# Patient Record
(demographics unavailable — no encounter records)

---

## 2024-10-09 NOTE — PHYSICAL EXAM
[Normal Sclera/Conjunctiva] : normal sclera/conjunctiva [Normal] : normal rate, regular rhythm, normal S1 and S2 and no murmur heard [No Spinal Tenderness] : no spinal tenderness [de-identified] : good ROM of back

## 2024-10-09 NOTE — HISTORY OF PRESENT ILLNESS
[FreeTextEntry1] : Follow-up [de-identified] : 56-year-old female history of DM2, HTN, HLD presents to clinic today for medication refill.  Patient is new to provider.  Patient currently following with obesity medicine - Currently on Ozempic 2mg -Tolerating well feels as though weight loss has plateaued but notes sugars have been controlled -Will continue to follow with them   Taking BP meds as prescribed: Yes Diet: well varied with fruits and veg  Exercise: Hasn't been very active - walking and also trying to be more active  Sleep: sleeping 6-7 hrs sometimes wakes up at night but falls back to sleep right away  Ophthalmology: last appt withing the year Podiatry: "Notes has been a while" will make new appt   Low back pain - Per patient has been chronic that she used to do many sports when she was younger - Had PT in the past but has not been in many years notes it was helpful at that time -Denies numbness/tingling in lower extremities denies loss of sensation in groin area or legs or back denies inability to walk or difficulty walking, denies any issues with voiding. Mild has not needed any pain relief medications such as Aleve or Tylenol

## 2024-12-08 NOTE — HISTORY OF PRESENT ILLNESS
[Home] : at home, [unfilled] , at the time of the visit. [Medical Office: ([facility])___] : at the medical office located in  [Verbal consent obtained from patient] : the patient, [unfilled] [FreeTextEntry1] : 57F PMH metabolic syndrome w/class II obesity, DM2, HTN, dyslipidemia, palpitations, who presents to weight management for follow-up.  She initially presented 7/2023 reporting that she tended to gain weight when inactive, and noted gain during pregnancies and during the pandemic (from 250 lbs to 291 lbs, subsequently lost with lifestyle interventions). She was diagnosed with T2DM in April 2023, A1c 9%, was started on Jardiance 10 mg. Her A1c improved considerably over her first month post-dx (9% --> 7.9%). She notes she hadn't been started on metformin/GLP likely because she was having GI issues but they resolved with discontinuation of dairy. Her diet was noted for improved habits (eliminated dairy, more vegetables, lean proteins, smaller/early dinner w/o eating at night, all home-cooked meals). She recently started exercise.  We discussed lifestyle and dietary interventions.  She was prescribed Ozempic, she was already on Jardiance.  Weight today: 230 lbs, BMI 33 Weight at last visit (9/19/24): 235 lbs, BMI 33.72 Weight (6/4/24): 233 lbs, BMI 33.43 Weight (3/8/24): 229 lbs, BMI 32.86 Weight at Initial Visit (7/26/23): 253 lbs, BMI 36.3 Highest adult weight: 292 lbs (March/April 2023)  Medication: Has been on Ozempic 2 mg/wk, sometimes fatigue after taking it but no other reported adverse effects.   Diet: Tracking, getting protein and lower carbohydrate.  Usually 2 meals per day, sometimes a protein shake and meal (protein - chicken/fish/ occasional fish, veggies). B/D or L/D. Sometimes snack of fruit, or nuts. No soda/juice. Sometimes iced tea.   Exercise: Working out 2-3 x/wk,  (Less exercise, takes some walks, but hasn't been to the gym recently.

## 2024-12-08 NOTE — PHYSICAL EXAM
[Obese, well nourished, in no acute distress] : obese, well nourished, in no acute distress [de-identified] : TEB visit

## 2024-12-08 NOTE — ASSESSMENT
[FreeTextEntry1] : 57F PMH metabolic syndrome w/class II obesity, DM2, HTN, dyslipidemia, palpitations, who presents to weight management for follow-up.  # Metabolic syndrome w/class II obesity, DM2, HTN, dyslipidemia: Weight today 230 lbs, BMI 33, lost 5 lbs since last appointment 12 weeks ago, down 23 lbs from initial visit and 62 lbs from highest weight. Doing well on Ozempic 2 mg/wk. Dietary improvements, consistent exercise.  - Food log - C/w meal planning/prep - Discussed dietary recommendations, types of food to eat, intake timing, etc - Will consider dietician referral - Discussed building in light activity a few days per week and increasing.  - C/w Ozempic 2.0 mg/wk. May consider Metformin as well at some point, but overall she would like to minimize added meds so we will escalate incretin therapy and track biomarkers (ie A1c) to see if we need to add further agents (ie metformin/Jardiance). - F/u in ~3 months.

## 2024-12-08 NOTE — HISTORY OF PRESENT ILLNESS
Prescription sent to preferred pharmacy per protocol.   [Home] : at home, [unfilled] , at the time of the visit. [Medical Office: ([facility])___] : at the medical office located in  [Verbal consent obtained from patient] : the patient, [unfilled] [FreeTextEntry1] : 57F PMH metabolic syndrome w/class II obesity, DM2, HTN, dyslipidemia, palpitations, who presents to weight management for follow-up.  She initially presented 7/2023 reporting that she tended to gain weight when inactive, and noted gain during pregnancies and during the pandemic (from 250 lbs to 291 lbs, subsequently lost with lifestyle interventions). She was diagnosed with T2DM in April 2023, A1c 9%, was started on Jardiance 10 mg. Her A1c improved considerably over her first month post-dx (9% --> 7.9%). She notes she hadn't been started on metformin/GLP likely because she was having GI issues but they resolved with discontinuation of dairy. Her diet was noted for improved habits (eliminated dairy, more vegetables, lean proteins, smaller/early dinner w/o eating at night, all home-cooked meals). She recently started exercise.  We discussed lifestyle and dietary interventions.  She was prescribed Ozempic, she was already on Jardiance.  Weight today: 230 lbs, BMI 33 Weight at last visit (9/19/24): 235 lbs, BMI 33.72 Weight (6/4/24): 233 lbs, BMI 33.43 Weight (3/8/24): 229 lbs, BMI 32.86 Weight at Initial Visit (7/26/23): 253 lbs, BMI 36.3 Highest adult weight: 292 lbs (March/April 2023)  Medication: Has been on Ozempic 2 mg/wk, sometimes fatigue after taking it but no other reported adverse effects.   Diet: Tracking, getting protein and lower carbohydrate.  Usually 2 meals per day, sometimes a protein shake and meal (protein - chicken/fish/ occasional fish, veggies). B/D or L/D. Sometimes snack of fruit, or nuts. No soda/juice. Sometimes iced tea.   Exercise: Working out 2-3 x/wk,  (Less exercise, takes some walks, but hasn't been to the gym recently.

## 2024-12-08 NOTE — PHYSICAL EXAM
[Obese, well nourished, in no acute distress] : obese, well nourished, in no acute distress [de-identified] : TEB visit

## 2025-02-27 NOTE — PHYSICAL EXAM
[Obese, well nourished, in no acute distress] : obese, well nourished, in no acute distress [de-identified] : TEB visit

## 2025-02-27 NOTE — HISTORY OF PRESENT ILLNESS
[Home] : at home, [unfilled] , at the time of the visit. [Medical Office: ([facility])___] : at the medical office located in  [Verbal consent obtained from patient] : the patient, [unfilled] [FreeTextEntry1] : 57F PMH metabolic syndrome w/class II obesity, DM2, HTN, dyslipidemia, palpitations, who presents to weight management for follow-up.  She initially presented 7/2023 reporting that she tended to gain weight when inactive, and noted gain during pregnancies and during the pandemic (from 250 lbs to 291 lbs, subsequently lost with lifestyle interventions). She was diagnosed with T2DM in April 2023, A1c 9%, was started on Jardiance 10 mg. Her A1c improved considerably over her first month post-dx (9% --> 7.9%). She notes she hadn't been started on metformin/GLP likely because she was having GI issues but they resolved with discontinuation of dairy. Her diet was noted for improved habits (eliminated dairy, more vegetables, lean proteins, smaller/early dinner w/o eating at night, all home-cooked meals). She recently started exercise.  We discussed lifestyle and dietary interventions.  She was prescribed Ozempic, she was already on Jardiance.  Weight today:  Weight at last visit (12/6/25): 230 lbs, BMI 33 Weight (9/19/24): 235 lbs, BMI 33.72 Weight (6/4/24): 233 lbs, BMI 33.43 Weight (3/8/24): 229 lbs, BMI 32.86 Weight at Initial Visit (7/26/23): 253 lbs, BMI 36.3 Highest adult weight: 292 lbs (March/April 2023)  Medication:  (Has been on Ozempic 2 mg/wk, sometimes fatigue after taking it but no other reported adverse effects.   Diet:  (Tracking, getting protein and lower carbohydrate.  Usually 2 meals per day, sometimes a protein shake and meal (protein - chicken/fish/ occasional fish, veggies). B/D or L/D. Sometimes snack of fruit, or nuts. No soda/juice. Sometimes iced tea.   Exercise:  (Working out 2-3 x/wk,  (Less exercise, takes some walks, but hasn't been to the gym recently.

## 2025-03-03 NOTE — ASSESSMENT
[FreeTextEntry1] : 57F PMH metabolic syndrome w/class II obesity, DM2, HTN, dyslipidemia, palpitations, who presents to weight management for follow-up.  # Metabolic syndrome w/class II obesity, DM2, HTN, dyslipidemia: Weight today 227.5 lbs, BMI 32.64, lost a couple of pounds since last visit, has generally been stable, down almost 30 lbs since starting Wegovy and 65 lbs since starting to lose weight a few months earlier. Doing well on Ozempic 2 mg/wk, no significant adverse effects. Reporting consistency with diet/activity. Strength training limited by OA of knees and arthritis.  - Food log - C/w meal planning/prep - Discussed dietary recommendations, types of food to eat, intake timing, etc - Will consider dietician referral - Discussed building in light activity a few days per week and increasing.  - C/w Ozempic 2.0 mg/wk. May consider Metformin as well at some point, but overall she would like to minimize added meds so we will escalate incretin therapy and track biomarkers (ie A1c) to see if we need to add further agents (ie metformin/Jardiance). May also consider switch to mounjaro. - F/u in 3-6 months.

## 2025-03-03 NOTE — HISTORY OF PRESENT ILLNESS
[FreeTextEntry1] : 57F PMH metabolic syndrome w/class II obesity, DM2, HTN, dyslipidemia, palpitations, who presents to weight management for follow-up.  She is an  for a non-profit who initially presented 7/2023 reporting that she tended to gain weight when inactive, and noted gain during pregnancies and during the pandemic (from 250 lbs to 291 lbs, subsequently lost with lifestyle interventions). She was diagnosed with T2DM in April 2023, A1c 9%, was started on Jardiance 10 mg. Her A1c improved considerably over her first month post-dx (9% --> 7.9%). She notes she hadn't been started on metformin/GLP likely because she was having GI issues but they resolved with discontinuation of dairy. Her diet was noted for improved habits (eliminated dairy, more vegetables, lean proteins, smaller/early dinner w/o eating at night, all home-cooked meals). She recently started exercise.  We discussed lifestyle and dietary interventions.  She was prescribed Ozempic, she was already on Jardiance.  Weight today: 227.5 lbs, BMI 32.64 Weight at last visit (12/6/25): 230 lbs, BMI 33 Weight (9/19/24): 235 lbs, BMI 33.72 Weight (6/4/24): 233 lbs, BMI 33.43 Weight (3/8/24): 229 lbs, BMI 32.86 Weight at Initial Visit (7/26/23): 253 lbs, BMI 36.3 Highest adult weight: 292 lbs (March/April 2023)  Medication: Has been on Ozempic 2 mg/wk, sometimes fatigue after taking it but no other reported adverse effects.   Diet: Not tracking anymore. Feels diet is consistent. Getting protein and lower carbohydrate.  Usually 2 meals per day, sometimes a protein shake and meal (protein - chicken/fish/ occasional fish, veggies). B/D or L/D. Sometimes snack of fruit, or nuts. No soda/juice. Sometimes iced tea.   Exercise: Walking 2-3 x/wk, bilat knee OA and elbow pain.  (Less exercise, takes some walks, but hasn't been to the gym recently.